# Patient Record
Sex: FEMALE | Race: WHITE | NOT HISPANIC OR LATINO | URBAN - METROPOLITAN AREA
[De-identification: names, ages, dates, MRNs, and addresses within clinical notes are randomized per-mention and may not be internally consistent; named-entity substitution may affect disease eponyms.]

---

## 2020-12-13 ENCOUNTER — INPATIENT (INPATIENT)
Facility: HOSPITAL | Age: 30
LOS: 0 days | Discharge: HOME | DRG: 833 | End: 2020-12-14
Attending: OBSTETRICS & GYNECOLOGY | Admitting: OBSTETRICS & GYNECOLOGY
Payer: MEDICAID

## 2020-12-13 VITALS
DIASTOLIC BLOOD PRESSURE: 69 MMHG | HEART RATE: 90 BPM | TEMPERATURE: 98 F | SYSTOLIC BLOOD PRESSURE: 121 MMHG | RESPIRATION RATE: 19 BRPM

## 2020-12-13 DIAGNOSIS — Z34.80 ENCOUNTER FOR SUPERVISION OF OTHER NORMAL PREGNANCY, UNSPECIFIED TRIMESTER: ICD-10-CM

## 2020-12-13 DIAGNOSIS — O26.899 OTHER SPECIFIED PREGNANCY RELATED CONDITIONS, UNSPECIFIED TRIMESTER: ICD-10-CM

## 2020-12-13 DIAGNOSIS — O60.00 PRETERM LABOR WITHOUT DELIVERY, UNSPECIFIED TRIMESTER: ICD-10-CM

## 2020-12-13 DIAGNOSIS — Z3A.00 WEEKS OF GESTATION OF PREGNANCY NOT SPECIFIED: ICD-10-CM

## 2020-12-13 DIAGNOSIS — Z90.49 ACQUIRED ABSENCE OF OTHER SPECIFIED PARTS OF DIGESTIVE TRACT: Chronic | ICD-10-CM

## 2020-12-13 DIAGNOSIS — Z98.890 OTHER SPECIFIED POSTPROCEDURAL STATES: Chronic | ICD-10-CM

## 2020-12-13 LAB
ALBUMIN SERPL ELPH-MCNC: 3.5 G/DL — SIGNIFICANT CHANGE UP (ref 3.3–5)
ALP SERPL-CCNC: 76 U/L — SIGNIFICANT CHANGE UP (ref 40–120)
ALT FLD-CCNC: 18 U/L — SIGNIFICANT CHANGE UP (ref 10–45)
ANION GAP SERPL CALC-SCNC: 12 MMOL/L — SIGNIFICANT CHANGE UP (ref 5–17)
APPEARANCE UR: CLEAR — SIGNIFICANT CHANGE UP
APTT BLD: 26.3 SEC — LOW (ref 27.5–35.5)
AST SERPL-CCNC: 19 U/L — SIGNIFICANT CHANGE UP (ref 10–40)
BASOPHILS # BLD AUTO: 0 K/UL — SIGNIFICANT CHANGE UP (ref 0–0.2)
BASOPHILS NFR BLD AUTO: 0 % — SIGNIFICANT CHANGE UP (ref 0–2)
BILIRUB SERPL-MCNC: 0.1 MG/DL — LOW (ref 0.2–1.2)
BILIRUB UR-MCNC: NEGATIVE — SIGNIFICANT CHANGE UP
BUN SERPL-MCNC: 7 MG/DL — SIGNIFICANT CHANGE UP (ref 7–23)
CALCIUM SERPL-MCNC: 9 MG/DL — SIGNIFICANT CHANGE UP (ref 8.4–10.5)
CHLORIDE SERPL-SCNC: 105 MMOL/L — SIGNIFICANT CHANGE UP (ref 96–108)
CO2 SERPL-SCNC: 21 MMOL/L — LOW (ref 22–31)
COLOR SPEC: YELLOW — SIGNIFICANT CHANGE UP
CREAT SERPL-MCNC: 0.47 MG/DL — LOW (ref 0.5–1.3)
DIFF PNL FLD: NEGATIVE — SIGNIFICANT CHANGE UP
EOSINOPHIL # BLD AUTO: 0.41 K/UL — SIGNIFICANT CHANGE UP (ref 0–0.5)
EOSINOPHIL NFR BLD AUTO: 2.7 % — SIGNIFICANT CHANGE UP (ref 0–6)
FIBRINOGEN PPP-MCNC: 645 MG/DL — HIGH (ref 290–520)
GLUCOSE SERPL-MCNC: 79 MG/DL — SIGNIFICANT CHANGE UP (ref 70–99)
GLUCOSE UR QL: NEGATIVE — SIGNIFICANT CHANGE UP
HCT VFR BLD CALC: 34.3 % — LOW (ref 34.5–45)
HGB BLD-MCNC: 11.7 G/DL — SIGNIFICANT CHANGE UP (ref 11.5–15.5)
HIV 1 & 2 AB SERPL IA.RAPID: SIGNIFICANT CHANGE UP
INR BLD: 0.96 RATIO — SIGNIFICANT CHANGE UP (ref 0.88–1.16)
KETONES UR-MCNC: NEGATIVE — SIGNIFICANT CHANGE UP
LEUKOCYTE ESTERASE UR-ACNC: NEGATIVE — SIGNIFICANT CHANGE UP
LYMPHOCYTES # BLD AUTO: 17 % — SIGNIFICANT CHANGE UP (ref 13–44)
LYMPHOCYTES # BLD AUTO: 2.56 K/UL — SIGNIFICANT CHANGE UP (ref 1–3.3)
MCHC RBC-ENTMCNC: 30.6 PG — SIGNIFICANT CHANGE UP (ref 27–34)
MCHC RBC-ENTMCNC: 34.1 GM/DL — SIGNIFICANT CHANGE UP (ref 32–36)
MCV RBC AUTO: 89.8 FL — SIGNIFICANT CHANGE UP (ref 80–100)
MONOCYTES # BLD AUTO: 1.21 K/UL — HIGH (ref 0–0.9)
MONOCYTES NFR BLD AUTO: 8 % — SIGNIFICANT CHANGE UP (ref 2–14)
NEUTROPHILS # BLD AUTO: 10.9 K/UL — HIGH (ref 1.8–7.4)
NEUTROPHILS NFR BLD AUTO: 72.3 % — SIGNIFICANT CHANGE UP (ref 43–77)
NITRITE UR-MCNC: NEGATIVE — SIGNIFICANT CHANGE UP
PH UR: 6 — SIGNIFICANT CHANGE UP (ref 5–8)
PLATELET # BLD AUTO: 214 K/UL — SIGNIFICANT CHANGE UP (ref 150–400)
POTASSIUM SERPL-MCNC: 3.6 MMOL/L — SIGNIFICANT CHANGE UP (ref 3.5–5.3)
POTASSIUM SERPL-SCNC: 3.6 MMOL/L — SIGNIFICANT CHANGE UP (ref 3.5–5.3)
PROT SERPL-MCNC: 6.4 G/DL — SIGNIFICANT CHANGE UP (ref 6–8.3)
PROT UR-MCNC: SIGNIFICANT CHANGE UP
PROTHROM AB SERPL-ACNC: 11.5 SEC — SIGNIFICANT CHANGE UP (ref 10.6–13.6)
RBC # BLD: 3.82 M/UL — SIGNIFICANT CHANGE UP (ref 3.8–5.2)
RBC # FLD: 12.6 % — SIGNIFICANT CHANGE UP (ref 10.3–14.5)
RH IG SCN BLD-IMP: POSITIVE — SIGNIFICANT CHANGE UP
SARS-COV-2 RNA SPEC QL NAA+PROBE: SIGNIFICANT CHANGE UP
SODIUM SERPL-SCNC: 138 MMOL/L — SIGNIFICANT CHANGE UP (ref 135–145)
SP GR SPEC: 1.02 — SIGNIFICANT CHANGE UP (ref 1.01–1.02)
URATE SERPL-MCNC: 3.5 MG/DL — SIGNIFICANT CHANGE UP (ref 2.5–7)
UROBILINOGEN FLD QL: NEGATIVE — SIGNIFICANT CHANGE UP
WBC # BLD: 15.08 K/UL — HIGH (ref 3.8–10.5)
WBC # FLD AUTO: 15.08 K/UL — HIGH (ref 3.8–10.5)

## 2020-12-13 RX ORDER — SODIUM CHLORIDE 9 MG/ML
1000 INJECTION, SOLUTION INTRAVENOUS
Refills: 0 | Status: DISCONTINUED | OUTPATIENT
Start: 2020-12-13 | End: 2020-12-13

## 2020-12-13 RX ORDER — LEVOTHYROXINE SODIUM 125 MCG
25 TABLET ORAL DAILY
Refills: 0 | Status: DISCONTINUED | OUTPATIENT
Start: 2020-12-13 | End: 2020-12-14

## 2020-12-13 RX ORDER — AMPICILLIN TRIHYDRATE 250 MG
2 CAPSULE ORAL ONCE
Refills: 0 | Status: COMPLETED | OUTPATIENT
Start: 2020-12-13 | End: 2020-12-13

## 2020-12-13 RX ORDER — AMPICILLIN TRIHYDRATE 250 MG
1 CAPSULE ORAL EVERY 4 HOURS
Refills: 0 | Status: DISCONTINUED | OUTPATIENT
Start: 2020-12-13 | End: 2020-12-14

## 2020-12-13 RX ORDER — SODIUM CHLORIDE 9 MG/ML
1000 INJECTION, SOLUTION INTRAVENOUS
Refills: 0 | Status: DISCONTINUED | OUTPATIENT
Start: 2020-12-13 | End: 2020-12-14

## 2020-12-13 RX ORDER — FOLIC ACID 0.8 MG
1 TABLET ORAL DAILY
Refills: 0 | Status: DISCONTINUED | OUTPATIENT
Start: 2020-12-13 | End: 2020-12-14

## 2020-12-13 RX ORDER — NIFEDIPINE 30 MG
10 TABLET, EXTENDED RELEASE 24 HR ORAL EVERY 6 HOURS
Refills: 0 | Status: DISCONTINUED | OUTPATIENT
Start: 2020-12-13 | End: 2020-12-14

## 2020-12-13 RX ORDER — NIFEDIPINE 30 MG
10 TABLET, EXTENDED RELEASE 24 HR ORAL
Refills: 0 | Status: COMPLETED | OUTPATIENT
Start: 2020-12-13 | End: 2020-12-13

## 2020-12-13 RX ORDER — SODIUM CHLORIDE 9 MG/ML
1000 INJECTION, SOLUTION INTRAVENOUS ONCE
Refills: 0 | Status: DISCONTINUED | OUTPATIENT
Start: 2020-12-13 | End: 2020-12-14

## 2020-12-13 RX ORDER — AMPICILLIN TRIHYDRATE 250 MG
CAPSULE ORAL
Refills: 0 | Status: DISCONTINUED | OUTPATIENT
Start: 2020-12-13 | End: 2020-12-14

## 2020-12-13 RX ADMIN — Medication 10 MILLIGRAM(S): at 18:50

## 2020-12-13 RX ADMIN — Medication 10 MILLIGRAM(S): at 19:12

## 2020-12-13 RX ADMIN — Medication 108 GRAM(S): at 23:04

## 2020-12-13 RX ADMIN — Medication 10 MILLIGRAM(S): at 18:30

## 2020-12-13 RX ADMIN — Medication 216 GRAM(S): at 18:31

## 2020-12-13 RX ADMIN — Medication 12 MILLIGRAM(S): at 18:33

## 2020-12-13 NOTE — OB PROVIDER H&P - PROBLEM SELECTOR PLAN 1
-Admit to L&D, routine labs, IVF  -EFM + [place] cat1  -Procardia for tocolysis  -Amp for GBS unknown - GBS sent  -[facility] for fetal lung maturity  -COVID swab performed  -Synthroid for hypothyroid  -Consents done  -Low risk DVT    [facility] Dr[facility]

## 2020-12-13 NOTE — OB PROVIDER LABOR PROGRESS NOTE - NS_SUBJECTIVE/OBJECTIVE_OBGYN_ALL_OB_FT
Went to bedside to swab  for COVID. Patient mentioned baby was measuring small. She states that ctx pain has improved.    BSUS: vtx, fundal, REMIGIO 11, BPP 8/8 EFW 1981 4lb6oz HC<1% AC 12%    Called provider on call for patient's OB:    Per provider, PNC uncomplicated. On 12/9 patient had sono which showed IUGR 7% 4bz13ss with HC<1% for the first time with normal dopplers.     Awaiting records from [facility]

## 2020-12-13 NOTE — OB PROVIDER TRIAGE NOTE - NSOBPROVIDERNOTE_OBGYN_ALL_OB_FT
Pt is a 31YO  @ 33.5wks presenting w/ abdominal cramping for r/o PTL  -UA/UCx  -1L [place]  -EFM + [place] until results  -Re-eval in 2hrs    [facility] pgy3 d/w Dr. Kirkpatrick

## 2020-12-13 NOTE — OB PROVIDER TRIAGE NOTE - NSHPPHYSICALEXAM_GEN_ALL_CORE
Vital Signs Last 24 Hrs  T(C): 36.8 (13 Dec 2020 14:11), Max: 36.8 (13 Dec 2020 13:56)  T(F): 98.2 (13 Dec 2020 14:11), Max: 98.24 (13 Dec 2020 13:56)  HR: 81 (13 Dec 2020 15:12) (79 - 90)  BP: 121/69 (13 Dec 2020 14:11) (121/69 - 121/69)  BP(mean): --  RR: 19 (13 Dec 2020 14:11) (19 - 19)  SpO2: 96% (13 Dec 2020 15:12) (96% - 100%)    EFM: , mod oxana, +accel, no decel  [place]: Ctx Q2-3min  Sono: Fundal placenta, vtx  SSE: Closed  VE: 0/0/-3 soft

## 2020-12-13 NOTE — OB PROVIDER TRIAGE NOTE - HISTORY OF PRESENT ILLNESS
Pt is a 31 YO  @ 33.5wks presenting w/ abdominal pain/cramping. The patient said that it started yesterday afternoon. The pain/cramping stopped overnight and started again this afternoon. It comes and goes and she feels her abdomen becoming tight when it happens. The patient is asymptomatic otherwise. +FM. Denies LOF/VB, HA, dizziness, lightheadedness, CP, SOB, palpitations, vaginal discharge, urinary/bowel symptoms. PNC is Tri. Says that pregnancy has been uncomplicated, but was told the fetus was "a bit small" last week on the last ultrasound.     OBHx: 1x SAB  GYN: Ovarian cyst s/p LSC Ovarian cystectomy (), Denies abnormal pap, STI, endo, fibroid  PMH: Hypothyroid  PSH: Above, LSC Appendectomy   All: NKDA  Meds: PNV, Synthroid 25mcg  Psych: Denies  Soc: Denies  Fhx: Denies

## 2020-12-13 NOTE — OB PROVIDER H&P - NSHPPHYSICALEXAM_GEN_ALL_CORE
Vital Signs Last 24 Hrs  T(C): 36.8 (13 Dec 2020 14:11), Max: 36.8 (13 Dec 2020 13:56)  T(F): 98.2 (13 Dec 2020 14:11), Max: 98.24 (13 Dec 2020 13:56)  HR: 82 (13 Dec 2020 18:34) (74 - 94)  BP: 119/68 (13 Dec 2020 18:29) (119/68 - 121/69)  BP(mean): --  RR: 19 (13 Dec 2020 14:11) (19 - 19)  SpO2: 97% (13 Dec 2020 18:34) (86% - 100%)    EFM: , mod oxana, +accel, no decel  [place]: Ctx Q2min  Sono: Fundal placenta, Vtx  SSE: Closed  VE: 0/0/-3 ----> 0/70/-3

## 2020-12-13 NOTE — OB PROVIDER H&P - ATTENDING COMMENTS
Patient discussed with resident. Presents with complaints of intermittent abdominal pain. Denies vaginal bleeding or leakage of fluid. Reports fetal movement.  history significant for hypothyroidism on Synthroid 25mcg PO daily. Patient discussed with resident. Presents with complaints of intermittent abdominal pain. Denies vaginal bleeding or leakage of fluid. Reports fetal movement.  history significant for hypothyroidism on Synthroid 25mcg PO daily. Receives prenatal care in [place], mostly uncomplicated prenatal course.   Past medical and surgical history reviewed. Denies medication allergies.   Vitals reviewed   Resident exam reviewed   EFM: reactive   [place]: regular contractions   UA negative   A/P: IUP at 33+wks, suspected  labor   -betamethasone + Procardia + Ampicillin   -continue EFM and toco   -COVID testing   -Synthroid PO daily     AILYN Kirkpatrick

## 2020-12-14 ENCOUNTER — TRANSCRIPTION ENCOUNTER (OUTPATIENT)
Age: 30
End: 2020-12-14

## 2020-12-14 ENCOUNTER — ASOB RESULT (OUTPATIENT)
Age: 30
End: 2020-12-14

## 2020-12-14 ENCOUNTER — APPOINTMENT (OUTPATIENT)
Dept: ANTEPARTUM | Facility: CLINIC | Age: 30
End: 2020-12-14

## 2020-12-14 VITALS
HEART RATE: 87 BPM | RESPIRATION RATE: 18 BRPM | SYSTOLIC BLOOD PRESSURE: 100 MMHG | TEMPERATURE: 99 F | DIASTOLIC BLOOD PRESSURE: 61 MMHG | OXYGEN SATURATION: 98 %

## 2020-12-14 PROBLEM — E06.3 AUTOIMMUNE THYROIDITIS: Chronic | Status: ACTIVE | Noted: 2020-12-13

## 2020-12-14 PROBLEM — N83.209 UNSPECIFIED OVARIAN CYST, UNSPECIFIED SIDE: Chronic | Status: ACTIVE | Noted: 2020-12-13

## 2020-12-14 LAB
AMPHET UR-MCNC: NEGATIVE — SIGNIFICANT CHANGE UP
APPEARANCE UR: CLEAR — SIGNIFICANT CHANGE UP
BACTERIA # UR AUTO: NEGATIVE — SIGNIFICANT CHANGE UP
BARBITURATES UR SCN-MCNC: NEGATIVE — SIGNIFICANT CHANGE UP
BENZODIAZ UR-MCNC: NEGATIVE — SIGNIFICANT CHANGE UP
BILIRUB UR-MCNC: NEGATIVE — SIGNIFICANT CHANGE UP
C TRACH RRNA SPEC QL NAA+PROBE: SIGNIFICANT CHANGE UP
COCAINE METAB.OTHER UR-MCNC: NEGATIVE — SIGNIFICANT CHANGE UP
COLOR SPEC: SIGNIFICANT CHANGE UP
CULTURE RESULTS: SIGNIFICANT CHANGE UP
DIFF PNL FLD: ABNORMAL
EPI CELLS # UR: 4 /HPF — SIGNIFICANT CHANGE UP
GLUCOSE UR QL: NEGATIVE — SIGNIFICANT CHANGE UP
HBV SURFACE AG SER-ACNC: SIGNIFICANT CHANGE UP
HYALINE CASTS # UR AUTO: 1 /LPF — SIGNIFICANT CHANGE UP (ref 0–2)
KETONES UR-MCNC: ABNORMAL
LEUKOCYTE ESTERASE UR-ACNC: ABNORMAL
METHADONE UR-MCNC: NEGATIVE — SIGNIFICANT CHANGE UP
N GONORRHOEA RRNA SPEC QL NAA+PROBE: SIGNIFICANT CHANGE UP
NITRITE UR-MCNC: NEGATIVE — SIGNIFICANT CHANGE UP
OPIATES UR-MCNC: NEGATIVE — SIGNIFICANT CHANGE UP
OXYCODONE UR-MCNC: NEGATIVE — SIGNIFICANT CHANGE UP
PCP SPEC-MCNC: SIGNIFICANT CHANGE UP
PCP UR-MCNC: NEGATIVE — SIGNIFICANT CHANGE UP
PH UR: 7.5 — SIGNIFICANT CHANGE UP (ref 5–8)
PROT UR-MCNC: NEGATIVE — SIGNIFICANT CHANGE UP
RBC CASTS # UR COMP ASSIST: 0 /HPF — SIGNIFICANT CHANGE UP (ref 0–4)
RUBV IGG SER-ACNC: 29.8 INDEX — SIGNIFICANT CHANGE UP
RUBV IGG SER-IMP: POSITIVE — SIGNIFICANT CHANGE UP
SARS-COV-2 IGG SERPL QL IA: NEGATIVE — SIGNIFICANT CHANGE UP
SARS-COV-2 IGM SERPL IA-ACNC: 0.06 INDEX — SIGNIFICANT CHANGE UP
SP GR SPEC: 1.01 — SIGNIFICANT CHANGE UP (ref 1.01–1.02)
SPECIMEN SOURCE: SIGNIFICANT CHANGE UP
SPECIMEN SOURCE: SIGNIFICANT CHANGE UP
T PALLIDUM AB TITR SER: NEGATIVE — SIGNIFICANT CHANGE UP
THC UR QL: NEGATIVE — SIGNIFICANT CHANGE UP
UROBILINOGEN FLD QL: NEGATIVE — SIGNIFICANT CHANGE UP
WBC UR QL: 2 /HPF — SIGNIFICANT CHANGE UP (ref 0–5)

## 2020-12-14 PROCEDURE — 87491 CHLMYD TRACH DNA AMP PROBE: CPT

## 2020-12-14 PROCEDURE — 86850 RBC ANTIBODY SCREEN: CPT

## 2020-12-14 PROCEDURE — 81001 URINALYSIS AUTO W/SCOPE: CPT

## 2020-12-14 PROCEDURE — 86703 HIV-1/HIV-2 1 RESULT ANTBDY: CPT

## 2020-12-14 PROCEDURE — U0003: CPT

## 2020-12-14 PROCEDURE — 87086 URINE CULTURE/COLONY COUNT: CPT

## 2020-12-14 PROCEDURE — 76816 OB US FOLLOW-UP PER FETUS: CPT

## 2020-12-14 PROCEDURE — 87591 N.GONORRHOEAE DNA AMP PROB: CPT

## 2020-12-14 PROCEDURE — 86769 SARS-COV-2 COVID-19 ANTIBODY: CPT

## 2020-12-14 PROCEDURE — 87653 STREP B DNA AMP PROBE: CPT

## 2020-12-14 PROCEDURE — 86780 TREPONEMA PALLIDUM: CPT

## 2020-12-14 PROCEDURE — 99238 HOSP IP/OBS DSCHRG MGMT 30/<: CPT

## 2020-12-14 PROCEDURE — 99222 1ST HOSP IP/OBS MODERATE 55: CPT | Mod: 25

## 2020-12-14 PROCEDURE — 85384 FIBRINOGEN ACTIVITY: CPT

## 2020-12-14 PROCEDURE — 85025 COMPLETE CBC W/AUTO DIFF WBC: CPT

## 2020-12-14 PROCEDURE — 87340 HEPATITIS B SURFACE AG IA: CPT

## 2020-12-14 PROCEDURE — 84550 ASSAY OF BLOOD/URIC ACID: CPT

## 2020-12-14 PROCEDURE — 76818 FETAL BIOPHYS PROFILE W/NST: CPT

## 2020-12-14 PROCEDURE — 80307 DRUG TEST PRSMV CHEM ANLYZR: CPT

## 2020-12-14 PROCEDURE — 85610 PROTHROMBIN TIME: CPT

## 2020-12-14 PROCEDURE — 81003 URINALYSIS AUTO W/O SCOPE: CPT

## 2020-12-14 PROCEDURE — 76816 OB US FOLLOW-UP PER FETUS: CPT | Mod: 26

## 2020-12-14 PROCEDURE — 86900 BLOOD TYPING SEROLOGIC ABO: CPT

## 2020-12-14 PROCEDURE — G0463: CPT

## 2020-12-14 PROCEDURE — 86901 BLOOD TYPING SEROLOGIC RH(D): CPT

## 2020-12-14 PROCEDURE — 80053 COMPREHEN METABOLIC PANEL: CPT

## 2020-12-14 PROCEDURE — 76818 FETAL BIOPHYS PROFILE W/NST: CPT | Mod: 26

## 2020-12-14 PROCEDURE — 86762 RUBELLA ANTIBODY: CPT

## 2020-12-14 PROCEDURE — 59025 FETAL NON-STRESS TEST: CPT

## 2020-12-14 PROCEDURE — 85730 THROMBOPLASTIN TIME PARTIAL: CPT

## 2020-12-14 RX ORDER — LEVOTHYROXINE SODIUM 125 MCG
1 TABLET ORAL
Qty: 0 | Refills: 0 | DISCHARGE
Start: 2020-12-14

## 2020-12-14 RX ORDER — FOLIC ACID 0.8 MG
1 TABLET ORAL
Qty: 0 | Refills: 0 | DISCHARGE
Start: 2020-12-14

## 2020-12-14 RX ORDER — FOLIC ACID 0.8 MG
1 TABLET ORAL DAILY
Refills: 0 | Status: DISCONTINUED | OUTPATIENT
Start: 2020-12-14 | End: 2020-12-14

## 2020-12-14 RX ORDER — SODIUM CHLORIDE 9 MG/ML
1000 INJECTION, SOLUTION INTRAVENOUS
Refills: 0 | Status: DISCONTINUED | OUTPATIENT
Start: 2020-12-14 | End: 2020-12-14

## 2020-12-14 RX ADMIN — Medication 108 GRAM(S): at 05:59

## 2020-12-14 RX ADMIN — Medication 12 MILLIGRAM(S): at 18:26

## 2020-12-14 RX ADMIN — Medication 1 MILLIGRAM(S): at 16:47

## 2020-12-14 RX ADMIN — Medication 108 GRAM(S): at 02:11

## 2020-12-14 RX ADMIN — Medication 25 MICROGRAM(S): at 09:27

## 2020-12-14 RX ADMIN — Medication 1 TABLET(S): at 16:47

## 2020-12-14 NOTE — PROVIDER CONTACT NOTE (OTHER) - ASSESSMENT
patient vital signs are stable, verbalized having contraction once in an hour mild in intensity, streak of blood in tissue when wiping

## 2020-12-14 NOTE — PROGRESS NOTE ADULT - ATTENDING COMMENTS
29 YO  @ 33.5wks admitted with concerns for  labor. Reports improvement of intensity of CTX this morning. No OB issues.   Cervical change from 0/0/-3 to 1/70/-3. S/p 1st dose of steroids. For second dose today at 6pm. Procardia d/leonard overnight due to low Bps.  Will d/c amp and transfer to antepartum for observation and 2nd dose of BTM. SVE to be done prior to discharge tonight. F/u GBS.  For US today growth, dopplers and BPP due to reported IUGR. Records requested. 31 YO  @ 33.5wks admitted with concerns for  labor. Reports improvement of intensity of CTX this morning. No OB issues.   Cervical change from 0/0/-3 to 1/70/-3. S/p 1st dose of steroids. For second dose today at 6pm. Procardia d/leonard overnight due to low Bps.  Will d/c amp and transfer to antepartum for observation and 2nd dose of BTM. SVE to be done prior to discharge tonight. F/u GBS.  For US today growth, dopplers and BPP due to reported IUGR. Records requested.      I was physically present for the key portions of the evaluation and management services provided. I agree with the above history, physical, and plan which I have reviewed and edited where appropriate.   Dr Jonathan Callaway

## 2020-12-14 NOTE — OB PROVIDER LABOR PROGRESS NOTE - NS_OBIHIFHRDETAILS_OBGYN_ALL_OB_FT
115, mod oxana, +accels, no decels
110, mod oxana, +accels, no decels
110, mod oxana, +accels, no decels

## 2020-12-14 NOTE — PROGRESS NOTE ADULT - ASSESSMENT
31 YO  @ 33.5wks admitted to evaluate for PTL    Patient to complete BMZ course today (-).   Plan to recheck vaginal exam after BMZ course to see if there are signs of further cervical change   No indication for tocolysis at this time  Will discontinue Ampicillin for GBS ppx. f/u GBS    ATU sonogram today due to verbal report of prior sonogram notable for IUGR from patients private OBGYN    Seen with Dr Jonathan Goode MD

## 2020-12-14 NOTE — DISCHARGE NOTE ANTEPARTUM - PLAN OF CARE
well being Please follow up with your OB doctor this week.  Return to L&D if you experience contractions every 3-5 minutes, leaking of fluid, vaginal bleeding like a period, or less than 5 fetal movements per hour.

## 2020-12-14 NOTE — PROVIDER CONTACT NOTE (OTHER) - BACKGROUND
PTL 33 6/5, patient having contraction with an interval of 3-4 minutes on admission to L&D, s/p BMZ 2 doses, s/p procardia x 3 doses,

## 2020-12-14 NOTE — OB PROVIDER LABOR PROGRESS NOTE - ASSESSMENT
Patient admitted for PTL. Patient symptomatically improved.   -will re-check, if same exam will stop amp and Procardia and send to [facility]  -awaiting records  -[facility]     D/w Dr. Thomson and GUERDA Goode PGY-7 aware  DELICIA Mujica PGY-3
Bleeding likely from exams. No signs of abruption, c/w current mgmt.    D/w nurse and Dr. Berto Mujica PGY-3
Patient has made cervical change from prior exam. Will continue on amp and Procardia if BP can tolerate.    D/w Dr. Berto Mujica PGY-3

## 2020-12-14 NOTE — OB PROVIDER LABOR PROGRESS NOTE - NS_SUBJECTIVE/OBJECTIVE_OBGYN_ALL_OB_FT
Called to bedside as patient was spotting dark red blood when she voided.    Scant blood approx <5cc noted on pad

## 2020-12-14 NOTE — DISCHARGE NOTE ANTEPARTUM - PATIENT PORTAL LINK FT
You can access the FollowMyHealth Patient Portal offered by [facility] by registering at the following website: [email or web address]. By joining [facility]’s FollowMyHealth portal, you will also be able to view your health information using other applications (apps) compatible with our system.

## 2020-12-14 NOTE — PROVIDER CONTACT NOTE (OTHER) - SITUATION
Patient complaint of contraction once in an hour, mild in intensity, streak of blood on a tissue when wiping

## 2020-12-14 NOTE — DISCHARGE NOTE ANTEPARTUM - HOSPITAL COURSE
Patient was admitted to L&D with  labor, made cervical change from 0 to 1cm. Pt was given BMZ for fetal lung maturity and Amp for GBS unknown. Pt did not make further cervical change. Pt was discharged in stable condition.

## 2020-12-14 NOTE — DISCHARGE NOTE ANTEPARTUM - PROVIDER TOKENS
FREE:[LAST:[[facility]],PHONE:[phone]],FAX:[(   [phone]    -],ADDRESS:[[address] [place] [address]]]

## 2020-12-14 NOTE — PROGRESS NOTE ADULT - SUBJECTIVE AND OBJECTIVE BOX
MFM Fellow    Patient seen at bedside. No acute complaints. OOB as tolerated. +FM, denies lof/vb. Currently has an appetite. Contractions are still present, however improved in severity.     PNC with Dr. Mariscal in [facility]    Vital Signs Last 24 Hrs  T(C): 36.6 (14 Dec 2020 06:00), Max: 36.8 (13 Dec 2020 13:56)  HR: 83 (14 Dec 2020 09:30) (73 - 117)  BP: 94/55 (14 Dec 2020 06:00) (94/55 - 121/69)  RR: 18 (14 Dec 2020 06:00) (17 - 19)  SpO2: 97% (14 Dec 2020 09:30) (86% - 100%)    , mod oxana, +accels, - decels  TOCO q2-6min  VE 1/70/-3 (by PGY)      Bedside Sonogram: vtx, fundal, REMIGIO 11, BPP 8/8 EFW 1981 4lb6oz HC<1% AC 12% (12/13)    Gen AAOx3, NAD  Abd: soft, nt, no rebound/guarding

## 2020-12-14 NOTE — DISCHARGE NOTE ANTEPARTUM - CARE PROVIDER_API CALL
[facility],   [address] [place] [address]  [address]  Phone: [phone]  Fax: (   )    -  Follow Up Time:

## 2020-12-14 NOTE — DISCHARGE NOTE ANTEPARTUM - CARE PLAN
Principal Discharge DX:	 labor  Goal:	well being  Assessment and plan of treatment:	Please follow up with your OB doctor this week.  Return to L&D if you experience contractions every 3-5 minutes, leaking of fluid, vaginal bleeding like a period, or less than 5 fetal movements per hour.

## 2020-12-14 NOTE — DISCHARGE NOTE ANTEPARTUM - MEDICATION SUMMARY - MEDICATIONS TO TAKE
I will START or STAY ON the medications listed below when I get home from the hospital:    Prenatal Multivitamins with Folic Acid 1 mg oral tablet  -- 1 tab(s) by mouth once a day  -- Indication: For Home med    levothyroxine 25 mcg (0.025 mg) oral tablet  -- 1 tab(s) by mouth once a day  -- Indication: For Home med    folic acid 1 mg oral tablet  -- 1 tab(s) by mouth once a day  -- Indication: For Home med

## 2020-12-15 LAB
GROUP B BETA STREP DNA (PCR): SIGNIFICANT CHANGE UP
GROUP B BETA STREP INTERPRETATION: SIGNIFICANT CHANGE UP
SOURCE GROUP B STREP: SIGNIFICANT CHANGE UP

## 2021-01-04 PROBLEM — Z00.00 ENCOUNTER FOR PREVENTIVE HEALTH EXAMINATION: Status: ACTIVE | Noted: 2021-01-04

## 2022-09-01 NOTE — OB RN PATIENT PROFILE - NS_SOCIALWORKCONS_OBGYN_ALL_OB
Caller: Rebecca Duckworth    [phone]    Timeframe paperwork needed: ASAP    Additional notes: PATIENT STATES SHE WILL BE ABLE TO RETURN TO WORK Tuesday, BUT HER EMPLOYER NEEDS A WORK RELEASE FAXED.   No